# Patient Record
Sex: FEMALE | Race: WHITE
[De-identification: names, ages, dates, MRNs, and addresses within clinical notes are randomized per-mention and may not be internally consistent; named-entity substitution may affect disease eponyms.]

---

## 2018-02-27 ENCOUNTER — HOSPITAL ENCOUNTER (OUTPATIENT)
Dept: HOSPITAL 47 - ORWHC2ENDO | Age: 58
Discharge: HOME | End: 2018-02-27
Attending: SURGERY
Payer: COMMERCIAL

## 2018-02-27 VITALS — RESPIRATION RATE: 16 BRPM | TEMPERATURE: 98.6 F

## 2018-02-27 VITALS — SYSTOLIC BLOOD PRESSURE: 177 MMHG | DIASTOLIC BLOOD PRESSURE: 97 MMHG

## 2018-02-27 VITALS — HEART RATE: 81 BPM

## 2018-02-27 VITALS — BODY MASS INDEX: 29.5 KG/M2

## 2018-02-27 DIAGNOSIS — Z79.899: ICD-10-CM

## 2018-02-27 DIAGNOSIS — K21.0: ICD-10-CM

## 2018-02-27 DIAGNOSIS — I10: ICD-10-CM

## 2018-02-27 DIAGNOSIS — K29.50: Primary | ICD-10-CM

## 2018-02-27 DIAGNOSIS — J45.909: ICD-10-CM

## 2018-02-27 DIAGNOSIS — K44.9: ICD-10-CM

## 2018-02-27 PROCEDURE — 88305 TISSUE EXAM BY PATHOLOGIST: CPT

## 2018-02-27 PROCEDURE — 43239 EGD BIOPSY SINGLE/MULTIPLE: CPT

## 2018-02-27 PROCEDURE — 88342 IMHCHEM/IMCYTCHM 1ST ANTB: CPT

## 2018-02-27 NOTE — P.GSHP
History of Present Illness


H&P Date: 02/27/18


Chief Complaint: GERD





This a 57-year-old female referred from Dr. Naylor.  Patient segments of GERD.  

She does today for EGD.  She has a known history of a hiatal hernia.  Her last 

EGD was over 5 years ago.





Past Medical History


Past Medical History: Asthma, GERD/Reflux, Hypertension


Additional Past Medical History / Comment(s): spouse states "stomach problems"


History of Any Multi-Drug Resistant Organisms: None Reported


Past Surgical History: Uterine Ablation


Additional Past Surgical History / Comment(s): nasal reconstruction


Past Anesthesia/Blood Transfusion Reactions: No Reported Reaction


Smoking Status: Never smoker





- Past Family History


  ** Mother


Family Medical History: Deep Vein Thrombosis (DVT)





Medications and Allergies


 Home Medications











 Medication  Instructions  Recorded  Confirmed  Type


 


Acetaminophen-Codeine 300-30mg 1 tab PO Q8H PRN 02/26/18 02/27/18 History





[Tylenol #3]    


 


Ibuprofen 600 mg PO TID PRN 02/26/18 02/26/18 History


 


Lisinopril 40 mg PO QAM 02/26/18 02/26/18 History


 


Methocarbamol [Robaxin-750] 750 mg PO TID PRN 02/26/18 02/26/18 History


 


Temazepam [Restoril] 15 mg PO HS PRN 02/26/18 02/26/18 History











 Allergies











Allergy/AdvReac Type Severity Reaction Status Date / Time


 


No Known Allergies Allergy   Verified 02/26/18 10:39














Surgical - Exam


 Vital Signs











Temp Pulse Resp BP Pulse Ox


 


 98.6 F   71   16   175/94   98 


 


 02/27/18 09:21  02/27/18 09:21  02/27/18 09:21  02/27/18 09:21  02/27/18 09:21














- General


well developed, no distress





- Eyes


PERRL





- ENT


normal pinna





- Neck


no masses





- Respiratory


normal expansion





- Cardiovascular


Rhythm: regular





- Abdomen


Abdomen: soft, non tender





Assessment and Plan


Assessment: 





GERD.  We'll perform EGD.

## 2018-02-27 NOTE — P.OP
Date of Procedure: 02/27/18


Preoperative Diagnosis: 


GERD


Postoperative Diagnosis: 


Antral gastritis





Sliding hiatal hernia





Mild esophagitis


Procedure(s) Performed: 


EGD


Anesthesia: MAC


Surgeon: Manuel Madera


Pathology: other (Antrum, esophagus)


Condition: stable


Disposition: PACU


Description of Procedure: 


The patient's placed on the endoscopy table in the lateral position.  She 

received IV sedation.  The gastroscope placed oropharynx and passed in the 

esophagus and into the stomach.  Scope was then placed through the pylorus.  

The first and second portion of the duodenum appeared normal.  Scope was then 

brought back the antrum and this was mildly inflamed.  A biopsies performed.  

The scope was retroflexed and remainder of the stomach appeared normal.  There 

was a moderate size sliding hiatal hernia.  The GE junction was at 38 cm.  The 

distal esophagus was minimal inflamed and a biopsies performed.  The proximal 

esophagus appeared normal.  Scope was then withdrawn from patient.

## 2018-03-20 ENCOUNTER — HOSPITAL ENCOUNTER (OUTPATIENT)
Dept: HOSPITAL 47 - LABPAT | Age: 58
Discharge: HOME | End: 2018-03-20
Attending: SURGERY
Payer: COMMERCIAL

## 2018-03-20 DIAGNOSIS — R13.19: ICD-10-CM

## 2018-03-20 DIAGNOSIS — D64.9: ICD-10-CM

## 2018-03-20 DIAGNOSIS — F17.200: ICD-10-CM

## 2018-03-20 DIAGNOSIS — Z01.818: ICD-10-CM

## 2018-03-20 DIAGNOSIS — Z01.812: Primary | ICD-10-CM

## 2018-03-20 DIAGNOSIS — K21.0: ICD-10-CM

## 2018-03-20 LAB
BASOPHILS # BLD AUTO: 0.1 K/UL (ref 0–0.2)
BASOPHILS NFR BLD AUTO: 1 %
EOSINOPHIL # BLD AUTO: 0.1 K/UL (ref 0–0.7)
EOSINOPHIL NFR BLD AUTO: 2 %
ERYTHROCYTE [DISTWIDTH] IN BLOOD BY AUTOMATED COUNT: 4.82 M/UL (ref 3.8–5.4)
ERYTHROCYTE [DISTWIDTH] IN BLOOD: 12.8 % (ref 11.5–15.5)
HCT VFR BLD AUTO: 41.8 % (ref 34–46)
HGB BLD-MCNC: 14.4 GM/DL (ref 11.4–16)
LYMPHOCYTES # SPEC AUTO: 1.5 K/UL (ref 1–4.8)
LYMPHOCYTES NFR SPEC AUTO: 30 %
MCH RBC QN AUTO: 30 PG (ref 25–35)
MCHC RBC AUTO-ENTMCNC: 34.6 G/DL (ref 31–37)
MCV RBC AUTO: 86.7 FL (ref 80–100)
MONOCYTES # BLD AUTO: 0.2 K/UL (ref 0–1)
MONOCYTES NFR BLD AUTO: 4 %
NEUTROPHILS # BLD AUTO: 3.1 K/UL (ref 1.3–7.7)
NEUTROPHILS NFR BLD AUTO: 61 %
PLATELET # BLD AUTO: 304 K/UL (ref 150–450)
WBC # BLD AUTO: 5 K/UL (ref 3.8–10.6)

## 2018-03-20 PROCEDURE — 86900 BLOOD TYPING SEROLOGIC ABO: CPT

## 2018-03-20 PROCEDURE — 85025 COMPLETE CBC W/AUTO DIFF WBC: CPT

## 2018-03-20 PROCEDURE — 93005 ELECTROCARDIOGRAM TRACING: CPT

## 2018-03-20 PROCEDURE — 36415 COLL VENOUS BLD VENIPUNCTURE: CPT

## 2018-03-20 PROCEDURE — 86901 BLOOD TYPING SEROLOGIC RH(D): CPT

## 2018-03-20 PROCEDURE — 86850 RBC ANTIBODY SCREEN: CPT

## 2018-03-26 ENCOUNTER — HOSPITAL ENCOUNTER (INPATIENT)
Dept: HOSPITAL 47 - 2ORMAIN | Age: 58
LOS: 2 days | Discharge: HOME | DRG: 328 | End: 2018-03-28
Attending: SURGERY | Admitting: SURGERY
Payer: COMMERCIAL

## 2018-03-26 VITALS — BODY MASS INDEX: 29.7 KG/M2

## 2018-03-26 DIAGNOSIS — K44.9: ICD-10-CM

## 2018-03-26 DIAGNOSIS — K31.89: ICD-10-CM

## 2018-03-26 DIAGNOSIS — Z79.899: ICD-10-CM

## 2018-03-26 DIAGNOSIS — G47.00: ICD-10-CM

## 2018-03-26 DIAGNOSIS — M06.9: ICD-10-CM

## 2018-03-26 DIAGNOSIS — I10: ICD-10-CM

## 2018-03-26 DIAGNOSIS — K21.0: Primary | ICD-10-CM

## 2018-03-26 DIAGNOSIS — J45.909: ICD-10-CM

## 2018-03-26 PROCEDURE — 0DV44ZZ RESTRICTION OF ESOPHAGOGASTRIC JUNCTION, PERCUTANEOUS ENDOSCOPIC APPROACH: ICD-10-PCS

## 2018-03-26 PROCEDURE — 0BUT4JZ SUPPLEMENT DIAPHRAGM WITH SYNTHETIC SUBSTITUTE, PERCUTANEOUS ENDOSCOPIC APPROACH: ICD-10-PCS

## 2018-03-26 PROCEDURE — 74210 X-RAY XM PHRNX&/CRV ESOPH C+: CPT

## 2018-03-26 PROCEDURE — 86850 RBC ANTIBODY SCREEN: CPT

## 2018-03-26 PROCEDURE — 86901 BLOOD TYPING SEROLOGIC RH(D): CPT

## 2018-03-26 PROCEDURE — 80048 BASIC METABOLIC PNL TOTAL CA: CPT

## 2018-03-26 PROCEDURE — 86900 BLOOD TYPING SEROLOGIC ABO: CPT

## 2018-03-26 RX ADMIN — LISINOPRIL SCH: 20 TABLET ORAL at 13:50

## 2018-03-26 RX ADMIN — MORPHINE SULFATE PRN MG: 1 INJECTION, SOLUTION EPIDURAL; INTRATHECAL; INTRAVENOUS at 20:40

## 2018-03-26 RX ADMIN — DEXTROSE MONOHYDRATE, SODIUM CHLORIDE, AND POTASSIUM CHLORIDE SCH MLS/HR: 50; 4.5; 1.49 INJECTION, SOLUTION INTRAVENOUS at 21:29

## 2018-03-26 RX ADMIN — MEPERIDINE HYDROCHLORIDE ONE MG: 50 INJECTION, SOLUTION INTRAMUSCULAR; INTRAVENOUS; SUBCUTANEOUS at 10:41

## 2018-03-26 RX ADMIN — MORPHINE SULFATE PRN MG: 1 INJECTION, SOLUTION EPIDURAL; INTRATHECAL; INTRAVENOUS at 13:02

## 2018-03-26 RX ADMIN — MORPHINE SULFATE PRN MG: 1 INJECTION, SOLUTION EPIDURAL; INTRATHECAL; INTRAVENOUS at 16:59

## 2018-03-26 RX ADMIN — FAMOTIDINE SCH MG: 10 INJECTION, SOLUTION INTRAVENOUS at 20:40

## 2018-03-26 RX ADMIN — MEPERIDINE HYDROCHLORIDE ONE MG: 50 INJECTION, SOLUTION INTRAMUSCULAR; INTRAVENOUS; SUBCUTANEOUS at 10:49

## 2018-03-26 RX ADMIN — DEXTROSE MONOHYDRATE, SODIUM CHLORIDE, AND POTASSIUM CHLORIDE SCH MLS/HR: 50; 4.5; 1.49 INJECTION, SOLUTION INTRAVENOUS at 13:52

## 2018-03-26 NOTE — P.GSHP
History of Present Illness


H&P Date: 03/26/18


Chief Complaint: GERD





This a 58-year-old female referred from Dr. Naylor.The patient has had long-

standing problems with reflux esophagitis.  The patient underwent recent EGD is 

found have evidence of esophagitis.  Patient has been well informed on the 

procedure of laparoscopic Nissen fundoplication.  The patient is aware the risk 

of the conversion to the open procedure, risk of injury to the stomach, liver 

and spleen.  The patient is also a risk of recurrent GERD and dysphagia 

symptoms.  The patient understands there is a postoperative diet of full 

liquids for 2 weeks after surgery.





Past Medical History


Past Medical History: Asthma, GERD/Reflux, Hypertension, Rheumatoid Arthritis (

RA)


Additional Past Medical History / Comment(s): hiatal hernia


History of Any Multi-Drug Resistant Organisms: None Reported


Past Surgical History: Uterine Ablation


Additional Past Surgical History / Comment(s): nasal reconstruction


Past Anesthesia/Blood Transfusion Reactions: Family History of Problems w/ 

Anesthesia, Motion Sickness


Additional Past Anesthesia/Blood Transfusion Reaction / Comment(s): "mom cant 

have anesthesia she stops breathing"


Smoking Status: Never smoker





- Past Family History


  ** Mother


Family Medical History: Deep Vein Thrombosis (DVT)





  ** Father


Family Medical History: Cancer


Additional Family Medical History / Comment(s): throat cancer





  ** Sister(s)


Family Medical History: Cancer


Additional Family Medical History / Comment(s): ovarian cancer





Medications and Allergies


 Home Medications











 Medication  Instructions  Recorded  Confirmed  Type


 


Acetaminophen-Codeine 300-30mg 1 tab PO Q8H PRN 02/26/18 03/26/18 History





[Tylenol #3]    


 


Ibuprofen 600 mg PO TID PRN 02/26/18 03/26/18 History


 


Lisinopril 40 mg PO QAM 02/26/18 03/26/18 History


 


Methocarbamol [Robaxin-750] 750 mg PO TID PRN 02/26/18 03/16/18 History


 


Temazepam [Restoril] 15 mg PO HS PRN 02/26/18 03/16/18 History


 


Albuterol Inhaler [Ventolin Hfa 1 - 2 puff INHALATION Q6HR PRN 03/16/18 03/16/ 18 History





Inhaler]    


 


Ergocalciferol (Vitamin D2) 50,000 unit PO Q14D 03/16/18 03/26/18 History





[Vitamin D2]    


 


Estradiol [Yuvafem] 10 mcg VG MOTH 03/16/18 03/16/18 History


 


Multivitamins, Thera [Multivitamin 1 tab PO DAILY 03/16/18 03/16/18 History





(formulary)]    











 Allergies











Allergy/AdvReac Type Severity Reaction Status Date / Time


 


No Known Allergies Allergy   Verified 03/26/18 08:13














Surgical - Exam


 Vital Signs











Temp Pulse Resp BP Pulse Ox


 


 97.7 F   79   16   166/90   97 


 


 03/26/18 08:24  03/26/18 08:24  03/26/18 08:24  03/26/18 08:24  03/26/18 08:24














- General


well developed, no distress





- Eyes


PERRL





- ENT


normal pinna





- Neck


no masses





- Respiratory


normal expansion





- Cardiovascular


Rhythm: regular





- Abdomen


Abdomen: soft, non tender





Assessment and Plan


Assessment: 





GERD.  We'll perform laparoscopic Nissen fundal plication.

## 2018-03-26 NOTE — P.CONS
History of Present Illness





- Chief Complaint


GERD 





- History of Present Illness





This is a 58-year-old female, well-known to Dr. Naylor.  She has a long history 

of GERD, has been on PPI's with no relief. She also has a medical history of 

asthma, hypertension, and rheumatoid arthritis. Today the patient underwent a 

laparoscopic Krystian fundoplication with Dr. Madera. Upon evaluation she was 

in quite a bit of pain.  She does have morphine and Tylenol 3 ordered for pain 

control. She was also hypertensive, partially due to her pain.otherwise the 

patient is stable.





Review of Systems


Constitutional: Denies chills, Denies fatigue, Denies fever


Ears, nose, mouth and throat: Denies epistaxis, Denies headache, Denies 

hoarseness, Denies nasal congestion


Cardiovascular: Reports high blood pressure, Denies dyspnea on exertion, Denies 

irregular heart beat, Denies orthopnea, Denies palpitations, Denies shortness 

of breath, Denies syncope


Respiratory: Denies congestion, Denies cough, Denies pain, Denies wheezing


Gastrointestinal: Reports heartburn, Denies change in bowel habits, Denies 

constipation, Denies loss of appetite, Denies nausea, Denies vomiting


Genitourinary: Denies kidney stones, Denies urgency, Denies urinary frequency


Musculoskeletal: Denies frequent falls, Denies gait dysfunction, Denies leg 

numbness/tingling, Denies low back pain, Denies neck pain


Integumentary: Denies lesions, Denies sores, Denies wounds


Neurological: Denies memory loss, Denies numbness, Denies paresthesias, Denies 

seizures, Denies syncope, Denies visual changes


Psychiatric: Denies confusion, Denies insomnia, Denies irritability


Endocrine: Denies fatigue, Denies palpitations


Hematologic/Lymphatic: Denies easy bleeding, Denies lymphadenopathy, Denies 

thrombophilia





Past Medical History


Past Medical History: Asthma, GERD/Reflux, Hypertension, Rheumatoid Arthritis (

RA)


Additional Past Medical History / Comment(s): hiatal hernia


History of Any Multi-Drug Resistant Organisms: None Reported


Past Surgical History: Uterine Ablation


Additional Past Surgical History / Comment(s): nasal reconstruction


Past Anesthesia/Blood Transfusion Reactions: Family History of Problems w/ 

Anesthesia, Motion Sickness


Additional Past Anesthesia/Blood Transfusion Reaction / Comm: "mom cant have 

anesthesia she stops breathing"


Past Psychological History: No Psychological Hx Reported


Smoking Status: Never smoker


Past Alcohol Use History: Rare


Past Drug Use History: None Reported





- Past Family History


  ** Mother


Family Medical History: Deep Vein Thrombosis (DVT)





  ** Father


Family Medical History: Cancer


Additional Family Medical History / Comment(s): throat cancer





  ** Sister(s)


Family Medical History: Cancer


Additional Family Medical History / Comment(s): ovarian cancer





Medications and Allergies


 Home Medications











 Medication  Instructions  Recorded  Confirmed  Type


 


Acetaminophen-Codeine 300-30mg 1 tab PO Q8H PRN 02/26/18 03/26/18 History





[Tylenol #3]    


 


Ibuprofen 600 mg PO TID PRN 02/26/18 03/26/18 History


 


Lisinopril 40 mg PO QAM 02/26/18 03/26/18 History


 


Methocarbamol [Robaxin-750] 750 mg PO TID PRN 02/26/18 03/26/18 History


 


Temazepam [Restoril] 15 mg PO HS PRN 02/26/18 03/26/18 History


 


Albuterol Inhaler [Ventolin Hfa 1 - 2 puff INHALATION RT-Q6H PRN 03/16/18 03/26/ 18 History





Inhaler]    


 


Ergocalciferol (Vitamin D2) 50,000 unit PO Q14D 03/16/18 03/26/18 History





[Vitamin D2]    


 


Estradiol [Yuvafem] 10 mcg VG MOTH 03/16/18 03/26/18 History


 


Multivitamins, Thera [Multivitamin 1 tab PO DAILY 03/16/18 03/26/18 History





(formulary)]    











 Allergies











Allergy/AdvReac Type Severity Reaction Status Date / Time


 


No Known Allergies Allergy   Verified 03/26/18 12:19














Physical Exam


Vitals: 


 Vital Signs











  Temp Pulse Pulse Resp BP Pulse Ox


 


 03/26/18 13:16    108 H  20  158/84  96


 


 03/26/18 12:40    101 H  20  173/86  97


 


 03/26/18 12:25    100  20  171/94  98


 


 03/26/18 12:10    98  20  173/87  99


 


 03/26/18 11:55  96.7 F L   94  20  169/95  99


 


 03/26/18 11:30   74   18  162/79  98


 


 03/26/18 11:15   62   18  170/73  95


 


 03/26/18 11:00   69   18  174/80  100


 


 03/26/18 10:46   94   18  184/78  100


 


 03/26/18 10:31  97.8 F  84   14  177/82  100


 


 03/26/18 09:15     16  154/82  98


 


 03/26/18 08:24  97.7 F   79  16  166/90  97








 Intake and Output











 03/26/18 03/26/18 03/26/18





 06:59 14:59 22:59


 


Intake Total  1400 


 


Output Total  5 


 


Balance  1395 


 


Intake:   


 


  IV  1400 


 


Output:   


 


  Estimated Blood Loss  5 


 


Other:   


 


  # Voids  2 














- Constitutional


General appearance: cooperative, no acute distress





- EENT


Eyes: PERRLA


ENT: hearing grossly normal





- Neck


Neck: no lymphadenopathy, normal ROM, no thyromegaly


Carotids: bilateral: upstroke normal


Thyroid: bilateral: normal size, negative: enlarged, nodule





- Respiratory


Respiratory: bilateral: CTA, negative: diminished, rales, rhonchi, wheezing





- Cardiovascular


Rhythm: regular


Heart sounds: normal: S1, S2





- Gastrointestinal


General gastrointestinal: normal bowel sounds, soft, tenderness





- Musculoskeletal


Musculoskeletal: strength equal bilaterally





- Psychiatric


Psychiatric: A&O x's 3





Assessment and Plan


Plan: 


1.GERD status post laparoscopic Krystian fundoplication postop day 1. continue 

with morphine and Tylenol 3 for pain control, will continue with clear liquid 

diet.will watch for any postoperative consultations.





2. hypertension. will continue with lisinopril 40 mg.





3. insomnia.  Continue Restoril 50 mg at at bedtime.





4. DVT prophylaxis continue with Lovenox





5.  GI prophylaxis continue Pepcid





6. asthma.  Continue albuterol as needed





The above impression and plan of care have been discussed and directed by 

signing physician. Mireya Garvin nurse practitioner acting as scribe for 

signing physician.

## 2018-03-26 NOTE — FL
EXAMINATION TYPE: FL esophagus cervic/pharynx

 

DATE OF EXAM: 3/26/2018

 

HISTORY: Post Krystian fundoplasty

 

COMPARISON: NONE

 

TECHNIQUE:  A double contrast esophagram is performed utilizing air and barium.  

 

FINDINGS: There is postoperative change of the GE junction. There is free air within the abdomen. Marie
gical clips in the gallbladder fossa. Air-filled and distended stomach is noted. Contrast passes acro
ss the GE junction with no obstruction or extravasation. There is a somewhat prominent pouch distal t
o the GE junction and evidence of twisting or volvulus of the portion of the upper gastric fundus. A 
small amount of contrast does pass into the small bowel.

 

Fluoroscopy time: 1 minute 19 seconds.

 

 IMPRESSION:  Postsurgical change. Below the level of surgery there is a twisting or volvulus of the 
stomach small bowel and contrast does pass into the small bowel. Report immediately called to the alvin gipson's nurse.

## 2018-03-27 LAB
ANION GAP SERPL CALC-SCNC: 9 MMOL/L
BUN SERPL-SCNC: 10 MG/DL (ref 7–17)
CALCIUM SPEC-MCNC: 9.1 MG/DL (ref 8.4–10.2)
CHLORIDE SERPL-SCNC: 104 MMOL/L (ref 98–107)
CO2 SERPL-SCNC: 29 MMOL/L (ref 22–30)
GLUCOSE SERPL-MCNC: 89 MG/DL (ref 74–99)
POTASSIUM SERPL-SCNC: 4.3 MMOL/L (ref 3.5–5.1)
SODIUM SERPL-SCNC: 142 MMOL/L (ref 137–145)

## 2018-03-27 RX ADMIN — MORPHINE SULFATE PRN MG: 1 INJECTION, SOLUTION EPIDURAL; INTRATHECAL; INTRAVENOUS at 05:06

## 2018-03-27 RX ADMIN — THERA TABS SCH EACH: TAB at 15:18

## 2018-03-27 RX ADMIN — FAMOTIDINE SCH MG: 10 INJECTION, SOLUTION INTRAVENOUS at 08:24

## 2018-03-27 RX ADMIN — DEXTROSE MONOHYDRATE, SODIUM CHLORIDE, AND POTASSIUM CHLORIDE SCH MLS/HR: 50; 4.5; 1.49 INJECTION, SOLUTION INTRAVENOUS at 23:40

## 2018-03-27 RX ADMIN — HYDROCHLOROTHIAZIDE SCH MG: 12.5 CAPSULE ORAL at 15:18

## 2018-03-27 RX ADMIN — ACETAMINOPHEN AND CODEINE PHOSPHATE PRN EACH: 300; 30 TABLET ORAL at 09:52

## 2018-03-27 RX ADMIN — DEXTROSE MONOHYDRATE, SODIUM CHLORIDE, AND POTASSIUM CHLORIDE SCH MLS/HR: 50; 4.5; 1.49 INJECTION, SOLUTION INTRAVENOUS at 05:06

## 2018-03-27 RX ADMIN — LISINOPRIL SCH MG: 20 TABLET ORAL at 08:24

## 2018-03-27 RX ADMIN — DEXTROSE MONOHYDRATE, SODIUM CHLORIDE, AND POTASSIUM CHLORIDE SCH MLS/HR: 50; 4.5; 1.49 INJECTION, SOLUTION INTRAVENOUS at 15:18

## 2018-03-27 RX ADMIN — FAMOTIDINE SCH MG: 10 INJECTION, SOLUTION INTRAVENOUS at 21:25

## 2018-03-27 RX ADMIN — ACETAMINOPHEN AND CODEINE PHOSPHATE PRN EACH: 300; 30 TABLET ORAL at 17:49

## 2018-03-27 RX ADMIN — MORPHINE SULFATE PRN MG: 1 INJECTION, SOLUTION EPIDURAL; INTRATHECAL; INTRAVENOUS at 01:07

## 2018-03-27 RX ADMIN — ENOXAPARIN SODIUM SCH MG: 40 INJECTION SUBCUTANEOUS at 08:24

## 2018-03-27 NOTE — P.PN
Subjective


Progress Note Date: 03/27/18





58-year-old seen at bedside.  Patient is then up ambulating in the hallway 

states tolerating clear liquid diet.  Reports no difficulty in swallowing no 

nausea vomiting.


Status post 27th of March laparoscopic Niesen fundoplication for symptomatic 

esophageal reflux symptoms





  Patient did have a esophagram done yesterday it showed below the level of 

surgery there was a twisting or volvulus of the stomach small bowel.  Contrast 

does pass into the small bowel





Objective





- Vital Signs


Vital signs: 


 Vital Signs











Temp  97.8 F   03/27/18 08:01


 


Pulse  69   03/27/18 08:01


 


Resp  18   03/27/18 08:01


 


BP  152/93   03/27/18 08:01


 


Pulse Ox  99   03/27/18 08:01








 Intake & Output











 03/26/18 03/27/18 03/27/18





 18:59 06:59 18:59


 


Intake Total 1600  250


 


Output Total 5  


 


Balance 1595  250


 


Weight 88.904 kg  


 


Intake:   


 


  IV 1400  


 


  Oral 200  250


 


Output:   


 


  Estimated Blood Loss 5  


 


Other:   


 


  # Voids 2  














- Exam





Exam


58-year-old female ambulating in the hallway no dizziness or lightheadedness


Lungs adequate air movement bilaterally no cough


Heart S1-S2 audible regular


Abdomen surgical dressing sites dry soft nondistended bowel tones present 

states tolerating a diet no nausea no vomiting no difficulty in swallowing


Extremities no edema





Assessment and Plan


Assessment: 





Impression


Long-standing symptoms reflex esophagitis failed outpatient treatment


A recent EGD showing evidence of esophagitis


Status post laparoscopic Nissen fundoplication for symptomatic esophageal 

reflux symptoms


Postop esophagram evidence of twisting or volvulus of the stomach small bowel











Plan


Full liquid diet


Prepped for probable discharge tomorrow if tolerating full liquid diet


Continue postop surgical care


Home meds as appropriate








The above impression and plan of care have been discussed and directed by 

signing physician. Eunice Panchal nurse practitioner acting as scribe for signing 

physician.

## 2018-03-28 VITALS — HEART RATE: 82 BPM | RESPIRATION RATE: 18 BRPM | SYSTOLIC BLOOD PRESSURE: 131 MMHG | DIASTOLIC BLOOD PRESSURE: 71 MMHG

## 2018-03-28 VITALS — TEMPERATURE: 97.4 F

## 2018-03-28 RX ADMIN — DEXTROSE MONOHYDRATE, SODIUM CHLORIDE, AND POTASSIUM CHLORIDE SCH: 50; 4.5; 1.49 INJECTION, SOLUTION INTRAVENOUS at 08:37

## 2018-03-28 RX ADMIN — ENOXAPARIN SODIUM SCH MG: 40 INJECTION SUBCUTANEOUS at 08:25

## 2018-03-28 RX ADMIN — ACETAMINOPHEN AND CODEINE PHOSPHATE PRN EACH: 300; 30 TABLET ORAL at 10:03

## 2018-03-28 RX ADMIN — MORPHINE SULFATE PRN MG: 1 INJECTION, SOLUTION EPIDURAL; INTRATHECAL; INTRAVENOUS at 06:16

## 2018-03-28 RX ADMIN — ACETAMINOPHEN AND CODEINE PHOSPHATE PRN EACH: 300; 30 TABLET ORAL at 01:50

## 2018-03-28 RX ADMIN — THERA TABS SCH EACH: TAB at 08:27

## 2018-03-28 RX ADMIN — LISINOPRIL SCH MG: 20 TABLET ORAL at 08:26

## 2018-03-28 RX ADMIN — FAMOTIDINE SCH MG: 10 INJECTION, SOLUTION INTRAVENOUS at 08:27

## 2018-03-28 RX ADMIN — HYDROCHLOROTHIAZIDE SCH MG: 12.5 CAPSULE ORAL at 08:36

## 2018-03-28 NOTE — P.DS
Providers


Date of admission: 


03/26/18 07:47





Expected date of discharge: 03/28/18


Attending physician: 


Manuel Madera





Consults: 





 





03/26/18 11:32


Consult Physician Routine 


   Consulting Provider: Carlos Enrique Naylor Reason/Comments: Medical management


   Do you want consulting provider notified?: Yes











Primary care physician: 


Carlos Enrique Naylor





[facility] Course: 





A 58-year-old female presented on an elective basis to undergo laparoscopic 

Nissen fundoplication for symptomatic reflux esophagitis.  Patient recently 

underwent an EGD which found evidence of esophagitis





 esophagram done  it showed below the level of surgery there was a twisting or 

volvulus of the stomach small bowel.  Contrast does pass into the small bowel





Status post 27th of March laparoscopic Niesen fundoplication for symptomatic 

esophageal reflux symptoms





On the day of discharge patient was up ambulatory on the unit tolerating a full 

liquid diet.  No difficulty in swallowing was felt to be appropriate to be 

discharged home





Impression


Long-standing symptoms reflex esophagitis failed outpatient treatment


A recent EGD showing evidence of esophagitis


Status post laparoscopic Nissen fundoplication for symptomatic esophageal 

reflux symptoms


Postop esophagram evidence of twisting or volvulus of the stomach small bowel





The above impression and plan of care have been discussed and directed by 

signing physician. Eunice Panchal nurse practitioner acting as scribe for signing 

physician.








Plan - Discharge Summary


Discharge Rx Participant: Yes


New Discharge Prescriptions: 


New


   amLODIPine [Norvasc] 10 mg PO DAILY #30 tab


   Hydrochlorothiazide [Hydrodiuril] 12.5 mg PO DAILY #30 cap





Continue


   Temazepam [Restoril] 15 mg PO HS PRN


     PRN Reason: sleep


   Lisinopril 40 mg PO QAM


   Methocarbamol [Robaxin-750] 750 mg PO TID PRN


     PRN Reason: Pain


   Ibuprofen 600 mg PO TID PRN


     PRN Reason: Pain


   Acetaminophen-Codeine 300-30mg [Tylenol w/codeine #3] 1 tab PO Q8H PRN


     PRN Reason: Pain


   Multivitamins, Thera [Multivitamin (formulary)] 1 tab PO DAILY


   Ergocalciferol (Vitamin D2) [Vitamin D2] 50,000 unit PO Q14D


   Estradiol [Yuvafem] 10 mcg VG MOTH


   Albuterol Inhaler [Ventolin Hfa Inhaler] 1 - 2 puff INHALATION RT-Q6H PRN


     PRN Reason: Dyspnea


Discharge Medication List





Acetaminophen-Codeine 300-30mg [Tylenol w/codeine #3] 1 tab PO Q8H PRN 02/26/18 

[History]


Ibuprofen 600 mg PO TID PRN 02/26/18 [History]


Lisinopril 40 mg PO QAM 02/26/18 [History]


Methocarbamol [Robaxin-750] 750 mg PO TID PRN 02/26/18 [History]


Temazepam [Restoril] 15 mg PO HS PRN 02/26/18 [History]


Albuterol Inhaler [Ventolin Hfa Inhaler] 1 - 2 puff INHALATION RT-Q6H PRN 03/16/ 18 [History]


Ergocalciferol (Vitamin D2) [Vitamin D2] 50,000 unit PO Q14D 03/16/18 [History]


Estradiol [Yuvafem] 10 mcg VG MOTH 03/16/18 [History]


Multivitamins, Thera [Multivitamin (formulary)] 1 tab PO DAILY 03/16/18 [History

]


Hydrochlorothiazide [Hydrodiuril] 12.5 mg PO DAILY #30 cap 03/28/18 [Rx]


amLODIPine [Norvasc] 10 mg PO DAILY #30 tab 03/28/18 [Rx]








Follow up Appointment(s)/Referral(s): 


Manuel Madera MD [STAFF PHYSICIAN] - 1 Week


Patient Instructions/Handouts:  Adult Laparoscopic Nissen Fundoplication (DC)


Activity/Diet/Wound Care/Special Instructions: 


Patient is to maintain a full liquid diet for 2 weeks post procedure


Discharge Disposition: HOME SELF-CARE

## 2018-03-29 NOTE — P.PN
Subjective


Progress Note Date: 03/28/18








This is a 58-year-old female, well-known to Dr. Naylor.  She has a long history 

of GERD, has been on PPI's with no relief. She also has a medical history of 

asthma, hypertension, and rheumatoid arthritis. Today the patient underwent a 

laparoscopic Krystian fundoplication with Dr. Madera. Upon evaluation she was 

in quite a bit of pain.  She does have morphine and Tylenol 3 ordered for pain 

control. She was also hypertensive, partially due to her pain.otherwise the 

patient is stable.


Norvasc 10 mg added for her hypertension


Patient encouraged to do incentive spirometer 10 times every hour while awake





3/28: Patient's blood pressure remains elevated and she has been added on 

Norvasc and hydrochlorothiazide.  These prescriptions will be sent to her 

pharmacy and patient is been instructed to take these until she follows up with 

Dr. Naylor.  Patient is scheduled for discharge home today.  Patient has been 

ambulatory in the hallway without chest pain, shortness of breath 

lightheadedness or dizziness.  Patient is tolerating full liquid diet.  No 

nausea or vomiting.











Objective





- Vital Signs


Vital signs: 


 Vital Signs











Temp  98.1 F   03/28/18 08:13


 


Pulse  77   03/28/18 08:13


 


Resp  19   03/28/18 08:13


 


BP  179/101   03/28/18 08:13


 


Pulse Ox  98   03/28/18 08:13








 Intake & Output











 03/27/18 03/28/18 03/28/18





 18:59 06:59 18:59


 


Intake Total 930  


 


Balance 930  


 


Intake:   


 


  Oral 930  


 


Other:   


 


  # Voids 2 1 1


 


  # Bowel Movements 1 1 














- Exam





General appearance: cooperative, no acute distress





- EENT


Eyes: PERRLA


ENT: hearing grossly normal





- Neck


Neck: no lymphadenopathy, normal ROM, no thyromegaly


Carotids: bilateral: upstroke normal


Thyroid: bilateral: normal size, negative: enlarged, nodule





- Respiratory


Respiratory: bilateral: CTA, negative: diminished, rales, rhonchi, wheezing





- Cardiovascular


Rhythm: regular


Heart sounds: normal: S1, S2





- Gastrointestinal


General gastrointestinal: normal bowel sounds, soft, tenderness





- Musculoskeletal


Musculoskeletal: strength equal bilaterally





- Psychiatric


Psychiatric: A&O x's 3





- Labs


CBC & Chem 7: 


 03/27/18 10:58





Assessment and Plan


Plan: 





1.GERD status post laparoscopic Krystian fundoplication postop day 1. continue 

with morphine and Tylenol 3 for pain control, will continue with clear liquid 

diet.will watch for any postoperative consultations.





2. hypertension. continue with lisinopril 40 mg and added in Norvasc and 

hydrochlorothiazide.





3. insomnia.  Continue Restoril 50 mg at at bedtime.





4. DVT prophylaxis continue with Lovenox





5.  GI prophylaxis continue Pepcid





6. asthma.  Continue albuterol as needed





Impression and plan of care have been directed as dictated by the signing 

physician.  Viktoriya Boo nurse practitioner acting as scribe for signing 

physician.

## 2018-04-19 NOTE — P.OP
Date of Procedure: 04/19/18


Preoperative Diagnosis: 


GERD


Postoperative Diagnosis: 


GERD


Procedure(s) Performed: 


Laparoscopic Nissen fundoplication with mesh repair of hiatal hernia


Anesthesia: MIGUEL ANGEL


Surgeon: Manuel Madera


Estimated Blood Loss (ml): 5


Pathology: none sent


Condition: stable


Disposition: PACU


Description of Procedure: 


The patient was placed on the operating table in the supine position.  She 

received general anesthesia.  She was then placed in dorsal lithotomy position.

  Her abdomen was prepped and draped in the usual sterile fashion.  The skin 

incision sites were anesthetized with 1% local Xylocaine.  The skin was incised 

in the left periumbilical area with an 11 scalpel.  Using a 5 mm blade was 

trocar under direct visitation the peritoneal cavity was entered.  And then 

insufflated.  After adequate insufflation the laparoscope was placed back into 

the peritoneal cavity.  Next a 5 mm trocar was placed in the right epigastric 

and then the right lateral position.  Another 5 mm trochars placed in the left 

lateral position.  Another 5 mm trocar placed in the left epigastric position.  

And the original left periumbilical trocar was exchanged for a 10 mm trocar.  

The left lateral lobe liver was retracted.  The patient had a large hiatal 

hernia.  Using the Harmonic scissors the crural defect was dissected in the 

Harmonic scissors were used to dissect the hiatal hernia sac.  The fundus of 

the stomach was completely mobilized by using the Harmonic scissors to divide 

short gastric vessels.  The stomach was reduced into the peritoneal cavity.  

The crura was dissected with the Harmonic scissors.  And then the crural repair 

was performed using 2-0 Ethibond suture.  The Martin bio A mesh was then placed 

over top of the repair and secured with 2-0 Ethibond suture.  Next a 58-Irish 

bougie dilator was placed the patient's oral pharynx and into the esophagus 

into the stomach by the CRNA.  The fundoplication was then performed using 2-0 

Ethibond suture.  A 360 fundoplication was performed.  At this point the 

dilator was withdrawn.  The stomach and esophagus were inspected there is known 

to any injury to the stomach or esophagus.  The abdomen was irrigated there is 

no bleeding seen.  The trochars are withdrawn.  Skin was closed interrupted 3-0 

Monocryl suture.  Dermabond was applied.  Patient tolerated procedure well and 

was sent to recovery in stable condition.

## 2021-09-14 ENCOUNTER — HOSPITAL ENCOUNTER (EMERGENCY)
Dept: HOSPITAL 47 - EC | Age: 61
Discharge: HOME | End: 2021-09-14
Payer: COMMERCIAL

## 2021-09-14 VITALS
SYSTOLIC BLOOD PRESSURE: 160 MMHG | DIASTOLIC BLOOD PRESSURE: 95 MMHG | HEART RATE: 80 BPM | TEMPERATURE: 97.5 F | RESPIRATION RATE: 16 BRPM
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
## 2021-09-14 DIAGNOSIS — I10: ICD-10-CM

## 2021-09-14 DIAGNOSIS — Z79.1: ICD-10-CM
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
## 2021-09-14 DIAGNOSIS — K21.9: ICD-10-CM

## 2021-09-14 DIAGNOSIS — Z79.51: ICD-10-CM

## 2021-09-14 DIAGNOSIS — M06.9: ICD-10-CM

## 2021-09-14 DIAGNOSIS — S80.11XA: ICD-10-CM

## 2021-09-14 DIAGNOSIS — J45.909: ICD-10-CM

## 2021-09-14 DIAGNOSIS — S39.012A: Primary | ICD-10-CM

## 2021-09-14 PROCEDURE — 99284 EMERGENCY DEPT VISIT MOD MDM: CPT

## 2021-09-14 PROCEDURE — 73610 X-RAY EXAM OF ANKLE: CPT

## 2021-09-14 PROCEDURE — 96372 THER/PROPH/DIAG INJ SC/IM: CPT

## 2021-09-14 PROCEDURE — 72110 X-RAY EXAM L-2 SPINE 4/>VWS: CPT

## 2021-09-14 PROCEDURE — 73502 X-RAY EXAM HIP UNI 2-3 VIEWS: CPT

## 2021-09-14 NOTE — ED
Fall HPI





- General


Chief Complaint: Fall


Stated Complaint: Fall, [facility]


Time Seen by Provider: 09/14/21 03:10


Source: patient


Mode of arrival: ambulatory





- History of Present Illness


Initial Comments: 


61 year-old female patient presents to the emergency department for evaluation 

of pain to the right ankle, low back, and left buttock after a slip and fall at 

work. Patient states she was walking, slipped and fell landing on her buttocks. 

States she hit her ankle and right knee on a dryer. States injury occurred 

around 1:30am. States she is having a pinching sensation to her back and left 

buttock and hip. Denies radiating pain down the legs. Denies any saddle 

anesthesia or loss of bowel or bladder control. States that as time went on the 

pain to her right ankle worsened. She is able to bear weight. States right knee 

pain resolved. She did not hit her head with the fall. States her neck feels 

stiff but she did not strike it on anything. Denies numbness, tingling, or pain 

down the arms.  Denies taking anything for pain.








- Related Data


                                Home Medications











 Medication  Instructions  Recorded  Confirmed


 


Acetaminophen-Codeine 300-30mg 1 tab PO Q8H PRN 02/26/18 03/26/18





[Tylenol w/codeine #3]   


 


Ibuprofen 600 mg PO TID PRN 02/26/18 03/26/18


 


Methocarbamol [Robaxin-750] 750 mg PO TID PRN 02/26/18 03/26/18


 


Temazepam [Restoril] 15 mg PO HS PRN 02/26/18 03/26/18


 


lisinopriL 40 mg PO QAM 02/26/18 03/26/18


 


Albuterol Inhaler (Mhu) [Ventolin 1 - 2 puff INHALATION RT-Q6H PRN 03/16/18 03/26/18





Hfa Inhaler (Mhu)]   


 


Ergocalciferol (Vitamin D2) 50,000 unit PO Q14D 03/16/18 03/26/18





[Vitamin D2]   


 


Estradiol [Yuvafem] 10 mcg VG MOTH 03/16/18 03/26/18


 


Multivitamins, Thera [Multivitamin 1 tab PO DAILY 03/16/18 03/26/18





(formulary)]   








                                  Previous Rx's











 Medication  Instructions  Recorded


 


amLODIPine [Norvasc] 10 mg PO DAILY #30 tab 03/28/18


 


hydroCHLOROthiazide [Hydrodiuril] 12.5 mg PO DAILY #30 cap 03/28/18


 


Ibuprofen [Motrin] 600 mg PO Q8HR PRN #30 tab 09/14/21











                                    Allergies











Allergy/AdvReac Type Severity Reaction Status Date / Time


 


No Known Allergies Allergy   Verified 09/14/21 03:05














Review of Systems


ROS Statement: 


Those systems with pertinent positive or pertinent negative responses have been 

documented in the HPI.





ROS Other: All systems not noted in ROS Statement are negative.





Past Medical History


Past Medical History: Asthma, GERD/Reflux, Hypertension, Rheumatoid Arthritis 

(RA)


Additional Past Medical History / Comment(s): hiatal hernia


History of Any Multi-Drug Resistant Organisms: None Reported


Past Surgical History: Hernia Repair, Uterine Ablation


Additional Past Surgical History / Comment(s): nasal reconstruction


Past Anesthesia/Blood Transfusion Reactions: Family History of Problems w/ 

Anesthesia, Motion Sickness


Additional Past Anesthesia/Blood Transfusion Reaction / Comment(s): "mom cant 

have anesthesia she stops breathing"


Past Psychological History: No Psychological Hx Reported


Smoking Status: Never smoker


Past Alcohol Use History: Rare


Past Drug Use History: None Reported





- Past Family History


  ** Mother


Family Medical History: Deep Vein Thrombosis (DVT)





  ** Father


Family Medical History: Cancer


Additional Family Medical History / Comment(s): throat cancer





  ** Sister(s)


Family Medical History: Cancer


Additional Family Medical History / Comment(s): ovarian cancer





General Exam


Limitations: no limitations


General appearance: alert, in no apparent distress, other (This is a well-

developed, well-nourished adult female patient in no acute distress.  Vital 

signs upon presentation temperature 97.5F, pulse 80, respirations 16, blood 

pressure 160/95, pulse ox 97% on room air.)


Head exam: Present: atraumatic, normocephalic, normal inspection


Eye exam: Present: normal appearance, PERRL, EOMI.  Absent: scleral icterus, 

conjunctival injection, nystagmus, periorbital swelling


ENT exam: Present: normal exam, normal oropharynx, mucous membranes moist


Neck exam: Present: normal inspection, full ROM, other (Nontender, no step-off, 

no deformity to firm midline palpation of the posterior cervical spine.  Full 

range of motion without pain or limitation.).  Absent: tenderness, meningismus, 

lymphadenopathy


Respiratory exam: Present: normal lung sounds bilaterally.  Absent: respiratory 

distress, wheezes, rales, rhonchi, stridor


Cardiovascular Exam: Present: regular rate, normal rhythm, normal heart sounds. 

 Absent: systolic murmur, diastolic murmur, rubs, gallop, clicks


GI/Abdominal exam: Present: soft, normal bowel sounds.  Absent: distended, 

tenderness, guarding, rebound, rigid


Extremities exam: Present: normal inspection, full ROM, normal capillary refill,

 other (Tenderness over the right lateral ankle.  Skin to the lower legs is 

pink, warm, dry.  Cap refill less than 3 seconds.  Pedal and posttibial pulses 

are 2+.).  Absent: tenderness, pedal edema, joint swelling, calf tenderness


Back exam: Present: normal inspection, vertebral tenderness (Lumbar)


Neurological exam: Present: alert, oriented X3, CN II-XII intact


Psychiatric exam: Present: normal affect, normal mood


Skin exam: Present: warm, dry, intact, normal color.  Absent: rash





Course


                                   Vital Signs











  09/14/21





  03:06


 


Temperature 97.5 F L


 


Pulse Rate 80


 


Respiratory 16





Rate 


 


Blood Pressure 160/95


 


O2 Sat by Pulse 97





Oximetry 














Medical Decision Making





- Medical Decision Making


61-year-old female patient presented to the emergency department today for 

evaluation of right ankle low back, and left buttock pain after a slip and fall 

at work.  Physical examination did reveal tenderness over the lower lumbar 

spine.  Tenderness over the right lateral ankle.  She is neurovascularly intact.

  Neurologically intact without deficits.  No concerning symptoms for cauda equ

chloé.  X-rays of the right ankle, low back, hip and pelvis were obtained and were

 all negative for any fractures.  Did discuss contusion and strain as a cause 

for her symptoms.  She'll be discharged with ibuprofen for pain control.  She is

 instructed to alternate ice and heat to the area.  We will keep her off work 

tonight however return tomorrow night.  She is instructed to follow-up with 

employee health services if her symptoms do not improve or she is unable to 

perform the duties of her job.  Return parameters were discussed in detail.  She

 verbalizes understanding and agrees with this plan.  Case discussed with my 

attending Dr. South.








- Radiology Data


Radiology results: report reviewed, image reviewed


3 views of the right ankle were obtained.  Report reviewed in its entirety.  

Impression by Dr. Elaine shows calcaneal spurring.  No fracture.





3 views of the pelvis and left hip are obtained.  Report was reviewed in its 

entirety.  Impression by Dr. Elaine shows negative pelvis and left hip exam.





Disposition


Clinical Impression: 


 Low back strain, Contusion of right leg





Disposition: HOME SELF-CARE


Condition: Good


Instructions (If sedation given, give patient instructions):  Low Back Strain 

(ED), Contusion in Adults (ED)


Additional Instructions: 


Take medication as directed for pain control.  Alternate ice and heat to the 

area.  Perform gentle range of motion exercises.  Follow-up the primary care 

physician or employee health services for further evaluation as soon as 

possible.  Return for any new, worsening, or concerning symptoms.


Prescriptions: 


Ibuprofen [Motrin] 600 mg PO Q8HR PRN #30 tab


 PRN Reason: Pain


Is patient prescribed a controlled substance at d/c from ED?: No


Referrals: 


Carlos Enrique Naylor MD [Primary Care Provider] - 1-2 days


Time of Disposition: 03:56

## 2021-09-14 NOTE — XR
EXAMINATION TYPE: XR Hip LT and AP Pelvis

 

DATE OF EXAM: 9/14/2021

 

COMPARISON: NONE

 

HISTORY: Left hip pain

 

TECHNIQUE: 3 views

 

FINDINGS: Pelvic ring is intact. Proximal left femur and hip joint appear intact. There is no hip dys
plasia. Hip joint space is fairly normal. Sacroiliac joints appear intact.

 

IMPRESSION: Negative pelvis and left hip exam.

## 2021-09-14 NOTE — XR
EXAMINATION TYPE: XR lumbosacral spine min 4V

 

DATE OF EXAM: 9/14/2021

 

COMPARISON: NONE

 

HISTORY: Low back pain

 

TECHNIQUE: 5 views

 

FINDINGS: Lumbar vertebra have fairly normal alignment. There is some degenerative disc space narrowi
ng in the mid and lower lumbar spine. There is no compression fracture. There is a few millimeter ant
erior subluxation of L4 in relation L5. There is no spondylolysis. Sacroiliac joints are intact.

 

IMPRESSION: There is a degenerative first-degree L4-5 spondylolisthesis. No compression fracture. Deg
enerative disc narrowing in the lower lumbar spine.

## 2021-09-14 NOTE — XR
EXAMINATION TYPE: XR ankle complete RT

 

DATE OF EXAM: 9/14/2021

 

COMPARISON: NONE

 

HISTORY: Ankle pain

 

TECHNIQUE: 3 views

 

FINDINGS: There is no sign of fracture nor dislocation. Joint spaces are normal. There is plantar los
caneal spurring.

 

IMPRESSION: Calcaneal spurring. No fracture.